# Patient Record
Sex: MALE | Race: BLACK OR AFRICAN AMERICAN | NOT HISPANIC OR LATINO
[De-identification: names, ages, dates, MRNs, and addresses within clinical notes are randomized per-mention and may not be internally consistent; named-entity substitution may affect disease eponyms.]

---

## 2023-11-13 ENCOUNTER — P2P PATIENT RECORD (OUTPATIENT)
Age: 43
End: 2023-11-13

## 2024-01-12 ENCOUNTER — OFFICE VISIT (OUTPATIENT)
Dept: URBAN - METROPOLITAN AREA CLINIC 40 | Facility: CLINIC | Age: 44
End: 2024-01-12
Payer: COMMERCIAL

## 2024-01-12 VITALS — SYSTOLIC BLOOD PRESSURE: 130 MMHG | DIASTOLIC BLOOD PRESSURE: 98 MMHG | HEIGHT: 73 IN | HEART RATE: 83 BPM

## 2024-01-12 DIAGNOSIS — R14.0 BLOATING: ICD-10-CM

## 2024-01-12 DIAGNOSIS — K59.09 CHRONIC CONSTIPATION: ICD-10-CM

## 2024-01-12 DIAGNOSIS — I48.91 AFIB: ICD-10-CM

## 2024-01-12 DIAGNOSIS — K21.9 GERD (GASTROESOPHAGEAL REFLUX DISEASE): ICD-10-CM

## 2024-01-12 DIAGNOSIS — E66.01 MORBID OBESITY: ICD-10-CM

## 2024-01-12 PROBLEM — 238136002: Status: ACTIVE | Noted: 2024-01-12

## 2024-01-12 PROCEDURE — 99203 OFFICE O/P NEW LOW 30 MIN: CPT | Performed by: NURSE PRACTITIONER

## 2024-01-12 RX ORDER — APIXABAN 5 MG/1
AS DIRECTED TABLET, FILM COATED ORAL
Status: ACTIVE | COMMUNITY
Start: 2024-01-11

## 2024-01-12 RX ORDER — PANTOPRAZOLE SODIUM 40 MG/1
1 TABLET TABLET, DELAYED RELEASE ORAL ONCE A DAY
Status: ACTIVE | COMMUNITY
Start: 2024-01-11

## 2024-01-12 RX ORDER — LOSARTAN POTASSIUM AND HYDROCHLOROTHIAZIDE 100; 25 MG/1; MG/1
AS DIRECTED TABLET, FILM COATED ORAL
Status: ACTIVE | COMMUNITY
Start: 2024-01-11

## 2024-01-12 RX ORDER — LOSARTAN POTASSIUM 100 MG/1
AS DIRECTED TABLET, FILM COATED ORAL
Status: ACTIVE | COMMUNITY
Start: 2024-01-11

## 2024-01-12 RX ORDER — IBUPROFEN 200 MG/1
AS DIRECTED TABLET, FILM COATED ORAL
Status: ACTIVE | COMMUNITY
Start: 2024-01-11

## 2024-01-12 RX ORDER — PANTOPRAZOLE SODIUM 40 MG/1
1 TABLET 30 MINUTES BEFORE BREAKFAST TABLET, DELAYED RELEASE ORAL ONCE A DAY
Qty: 30 | Refills: 3 | OUTPATIENT
Start: 2024-01-12

## 2024-01-12 RX ORDER — BENZONATATE 100 MG/1
AS DIRECTED CAPSULE ORAL
Status: ACTIVE | COMMUNITY
Start: 2024-01-11

## 2024-01-12 RX ORDER — DILTIAZEM HYDROCHLORIDE EXTENDED-RELEASE TABLETS 180 MG/1
AS DIRECTED TABLET, EXTENDED RELEASE ORAL
Status: ACTIVE | COMMUNITY
Start: 2024-01-11

## 2024-01-12 RX ORDER — AMLODIPINE BESYLATE 5 MG/1
AS DIRECTED TABLET ORAL
Status: ACTIVE | COMMUNITY
Start: 2024-01-11

## 2024-01-12 NOTE — PHYSICAL EXAM GASTROINTESTINAL
Large Abdomen , soft, nontender, nondistended , no guarding or rigidity , no masses palpable , normal bowel sounds

## 2024-01-12 NOTE — HPI-TODAY'S VISIT:
43-year-old male patient with past medical history as listed below, new patient. No referral noted in chart.  --11/12/23 [facility]: CT Suboptimal study due to streak artifact from body habitus. No obvious acute intra-abdominal or intrapelvic process.  well appearing, hypertensive- pt with hx of GERD, sx worse with food, ate acidic foods > likely GERD- labs including LFTs and lipase unremarkable- given pt body habitus, abd exam can be unreliable > CT a/p w/o acute process noted- EKG interpreted by me, NSR, no acute change from prior, no ischemic changes, POC trop neg > doubt atypical ACS- sx alleviated with protonix and carafate > dispo home with protonix, advised on dietary changes, will refer to GI.    --11/21/23: [facility]: Pt received IV Cardizem x 2 and started on Cardizem drip Echocardiogram with normal EFTSH 2.18CHA2 DS 2 Vasc score-1Cardiology consulted for further recommendations: Started on po cardizem and eliquis by cvm and cleared for discharge  HTN- hold nifedipine for now, now on cardizem.Resume losartan/hctz GERD- PPI, Morbid obesity - encouraged diet and weight loss referred to Dr Wolf with cardiology. New dx Afib.   Principal Problem:  Atrial fibrillation (HCC)Active Problems:  Atrial fibrillation with RVR (HCC)  HTN (hypertension)  Morbid obesity (HCC)   -- The patient presents today accompanied by caregiver staff. He has paperwork with referral and requested to be completed and returned to facility, [facility]. He is with mild intellectual disability and stutter speech impetement. Pleasant, Morbidly obese gentleman. He has c/o gas build up in stomach, burps alot, taking ppi at night. Symptoms worse in morning. After eat has a bubbling up. After breakfast fine, after lunch bubbles up. No pain. Sometimes has reflux. Denies alcohol, tobacco, NSAIDs. Denies family history stomach cancer, CRC. Last BM this morning, occasionaly strains. Usually has BM every day, bannas made him have constipation.

## 2024-01-12 NOTE — PHYSICAL EXAM CONSTITUTIONAL:
Morbidly obese, well developed, well nourished, in no acute distress, ambulating without difficulty, normal communication ability with stutter speech impediment

## 2024-02-07 ENCOUNTER — OV EP (OUTPATIENT)
Dept: URBAN - METROPOLITAN AREA CLINIC 40 | Facility: CLINIC | Age: 44
End: 2024-02-07
Payer: COMMERCIAL

## 2024-02-07 ENCOUNTER — LAB (OUTPATIENT)
Dept: URBAN - METROPOLITAN AREA CLINIC 40 | Facility: CLINIC | Age: 44
End: 2024-02-07

## 2024-02-07 VITALS
HEIGHT: 73 IN | TEMPERATURE: 97.9 F | HEART RATE: 65 BPM | DIASTOLIC BLOOD PRESSURE: 86 MMHG | SYSTOLIC BLOOD PRESSURE: 146 MMHG | WEIGHT: 315 LBS | BODY MASS INDEX: 41.75 KG/M2

## 2024-02-07 DIAGNOSIS — K59.09 CHRONIC CONSTIPATION: ICD-10-CM

## 2024-02-07 DIAGNOSIS — I48.91 AFIB: ICD-10-CM

## 2024-02-07 DIAGNOSIS — K21.9 GERD (GASTROESOPHAGEAL REFLUX DISEASE): ICD-10-CM

## 2024-02-07 DIAGNOSIS — R14.0 BLOATING: ICD-10-CM

## 2024-02-07 DIAGNOSIS — E66.01 MORBID OBESITY: ICD-10-CM

## 2024-02-07 PROCEDURE — 99213 OFFICE O/P EST LOW 20 MIN: CPT | Performed by: NURSE PRACTITIONER

## 2024-02-07 RX ORDER — LOSARTAN POTASSIUM 100 MG/1
AS DIRECTED TABLET, FILM COATED ORAL
Status: ON HOLD | COMMUNITY
Start: 2024-01-11

## 2024-02-07 RX ORDER — BENZONATATE 100 MG/1
AS DIRECTED CAPSULE ORAL
Status: ON HOLD | COMMUNITY
Start: 2024-01-11

## 2024-02-07 RX ORDER — AMLODIPINE BESYLATE 5 MG/1
AS DIRECTED TABLET ORAL
Status: ON HOLD | COMMUNITY
Start: 2024-01-11

## 2024-02-07 RX ORDER — PANTOPRAZOLE SODIUM 40 MG/1
1 TABLET 30 MINUTES BEFORE BREAKFAST TABLET, DELAYED RELEASE ORAL ONCE A DAY
Qty: 30 | Refills: 3 | Status: ON HOLD | COMMUNITY
Start: 2024-01-12

## 2024-02-07 RX ORDER — PANTOPRAZOLE SODIUM 40 MG/1
1 TABLET TABLET, DELAYED RELEASE ORAL ONCE A DAY
Status: ACTIVE | COMMUNITY
Start: 2024-01-11

## 2024-02-07 RX ORDER — IBUPROFEN 200 MG/1
AS DIRECTED TABLET, FILM COATED ORAL
Status: ON HOLD | COMMUNITY
Start: 2024-01-11

## 2024-02-07 RX ORDER — APIXABAN 5 MG/1
AS DIRECTED TABLET, FILM COATED ORAL
Status: ACTIVE | COMMUNITY
Start: 2024-01-11

## 2024-02-07 RX ORDER — DILTIAZEM HYDROCHLORIDE EXTENDED-RELEASE TABLETS 180 MG/1
AS DIRECTED TABLET, EXTENDED RELEASE ORAL
Status: ON HOLD | COMMUNITY
Start: 2024-01-11

## 2024-02-07 RX ORDER — LOSARTAN POTASSIUM AND HYDROCHLOROTHIAZIDE 100; 25 MG/1; MG/1
AS DIRECTED TABLET, FILM COATED ORAL
Status: ACTIVE | COMMUNITY
Start: 2024-01-11

## 2024-02-07 RX ORDER — NEBIVOLOL 5 MG/1
1 TABLET TABLET ORAL ONCE A DAY
Status: ACTIVE | COMMUNITY

## 2024-02-07 NOTE — HPI-TODAY'S VISIT:
43-year-old male patient with past medical history as listed below.  --11/12/23 [facility]: CT Suboptimal study due to streak artifact from body habitus. No obvious acute intra-abdominal or intrapelvic process.well appearing, hypertensive- pt with hx of GERD, sx worse with food,ate acidic foods  likely GERD- labs including LFTs and lipase unremarkable- given pt body habitus, abd exam can be unreliable  CTa/p w/o acute process noted- EKG interpreted by me, NSR, no acute change from prior, no ischemic changes, POC trop neg  doubt atypical ACS- sx alleviated with Protonix and Carafate. dispo home withprotonix, advised on dietary changes, will refer to GI.--11/21/23: [facility]: Pt received IV Cardizem x 2 and started on Cardizem drip Echocardiogram with normal EFTSH 2.18CHA2 DS 2Vasc score-1Cardiology consulted for further recommendations:Started on po Cardizem and Eliquis by cvm and cleared for discharge HTN- hold nifedipine for now, now on Cardizem.Resume losartan/hctz GERD- PPI, Morbid obesity - encouraged diet and weight loss referred to Dr Wolf with cardiology. New dx Afib.Principal Problem: Atrial fibrillation, Active Problems: Atrial fibrillation with RVR, HTN, Morbid obesity   -- 01/12/24: Recall I saw him as new patient accompanied by caregiver staff. He has paperwork with referral and requested to be completed and returned to facility, [facility]. He is with mild intellectual disability and stutter speech impediment. Pleasant, Morbidly obese gentleman. He has c/o gas build up in stomach, burps alot, taking PPI at night. Symptoms worse in morning. After eat has a bubbling up.After breakfast fine, after lunch bubbles up. No pain. Sometimes has reflux. Denies alcohol, tobacco, NSAIDs. Denies family history stomach cancer, CRC. Last BM this morning, occasionally strains.Usually has BM every day, bananas made him have constipation.  Plan: -- Gave sample of FDgard for bloating. -- Gave sample x 3 of MiraLAX for constipation. -- LOWFOMAP diet to follow. -- Gas, bloating handout. -- Take PPI in morning daily. -- 4 weeks f/u, if no better could consider stop ppi and do breath test and BS with tab.   -- The patient presents today accompanied by Estrellita, medical staff from [facility]. She says they have taken over administering his medications whereas he used to give his own meds. He is now taking PPI in mornings. He has had improvement of constipation with MiraLAX. He says feels like food doesn't go down sometimes, but denies choking or dysphagia. He says his bloating is better. He is taking Eliquis twice a day. Estrellita states he is having palpitations and cardiology wants him evaluated for Reflux. Will start with BS with Tab. Given his BMI, if EGD is indicated would need cardiac clearance to hold Eliquis and to be done at Hospital.

## 2024-02-07 NOTE — EXAM-PHYSICAL EXAM
CONSTITUTIONAL: Morbidly obese, well developed, well nourished, in no acute distress, ambulating without difficulty, normal communication ability with stutter speech impediment.         EYES: Conjuntivae and eyelids appear normal, Sclerae : White without injection.         HENT: Head, normocephalic, atraumatic, Face, Face within normal limits, Ears, External ears within normal limits.         CHEST: chest wall non-tender, breathing is unlabored without accessory muscle use, normal breath sounds.         CARDIOVASCULAR: no edema, no murmurs, regular rate and rhythm.         GASTROINTESTINAL Large Abdomen , soft, nontender, nondistended , no guarding or rigidity , no masses palpable , normal bowel sounds .

## 2024-03-18 ENCOUNTER — OV EP (OUTPATIENT)
Dept: URBAN - METROPOLITAN AREA CLINIC 40 | Facility: CLINIC | Age: 44
End: 2024-03-18
Payer: COMMERCIAL

## 2024-03-18 VITALS
OXYGEN SATURATION: 98 % | TEMPERATURE: 97.7 F | HEIGHT: 73 IN | HEART RATE: 60 BPM | DIASTOLIC BLOOD PRESSURE: 92 MMHG | WEIGHT: 315 LBS | SYSTOLIC BLOOD PRESSURE: 144 MMHG | BODY MASS INDEX: 41.75 KG/M2

## 2024-03-18 DIAGNOSIS — K59.09 CHRONIC CONSTIPATION: ICD-10-CM

## 2024-03-18 DIAGNOSIS — R14.0 BLOATING: ICD-10-CM

## 2024-03-18 DIAGNOSIS — E66.01 MORBID OBESITY: ICD-10-CM

## 2024-03-18 PROCEDURE — 99213 OFFICE O/P EST LOW 20 MIN: CPT | Performed by: NURSE PRACTITIONER

## 2024-03-18 RX ORDER — NEBIVOLOL 5 MG/1
1 TABLET TABLET ORAL ONCE A DAY
Status: ACTIVE | COMMUNITY

## 2024-03-18 RX ORDER — APIXABAN 5 MG/1
AS DIRECTED TABLET, FILM COATED ORAL
Status: ACTIVE | COMMUNITY
Start: 2024-01-11

## 2024-03-18 RX ORDER — PANTOPRAZOLE SODIUM 40 MG/1
1 TABLET TABLET, DELAYED RELEASE ORAL ONCE A DAY
Status: ACTIVE | COMMUNITY
Start: 2024-01-11

## 2024-03-18 RX ORDER — LOSARTAN POTASSIUM AND HYDROCHLOROTHIAZIDE 100; 25 MG/1; MG/1
AS DIRECTED TABLET, FILM COATED ORAL
Status: ACTIVE | COMMUNITY
Start: 2024-01-11

## 2024-03-18 NOTE — HPI-TODAY'S VISIT:
43-year-old male patient with past medical history as listed below.  --11/12/23 [facility]: CT Suboptimal study due to streak artifact from body habitus. No obvious acute intra-abdominal or intrapelvic process.well appearing, hypertensive- pt with hx of GERD, sx worse with food,ate acidic foods likely GERD- labs including LFTs and lipase unremarkable- given pt body habitus, abd exam can be unreliable CTa/p w/o acute process noted- EKG interpreted by me, NSR, no acute change from prior, no ischemic changes, POC trop neg doubt atypical ACS- sx alleviated with Protonix and Carafate. dispo home with protonix, advised on dietary changes, will refer to GI.  --11/21/23: [facility]: Pt received IV Cardizem x 2 and started on Cardizem drip Echocardiogram with normal EFTSH 2.18CHA2 DS 2Vasc [id number] consulted for further recommendations:Started on po Cardizem and Eliquis by cvm and cleared for discharge HTN- hold nifedipine for now, now on Cardizem.Resume losartan/hctz GERDPPI, Morbid obesity - encouraged diet and weight loss referred to Dr Wolf with cardiology. New dx Afib.Principal Problem: Atrial fibrillation, Active Problems: Atrial fibrillation with RVR, HTN, Morbid obesity  -- 01/12/24: Recall I saw him as new patient accompanied by caregiver staff. He has paperwork with referral and requested to be completed and returned to facility, [facility]. He is with mild intellectual disability and stutter speech impediment. Pleasant, Morbidly obese gentleman. He has c/o gas build up in stomach, burps alot, taking PPI at night. Symptoms worse in morning. After eat has a bubbling up. After breakfast fine, after lunch bubbles up. No pain. Sometimes has reflux. Denies alcohol, tobacco, NSAIDs. Denies family history stomach cancer, CRC. Last BM this morning, occasionally strains.Usually has BM every day, bananas made him have constipation. Plan: -- Gave sample of FDgard for bloating. -- Gave sample x 3 of MiraLAX for constipation. -- LOWFOMAP diet to follow. -- Gas, bloating handout. -- Take PPI in morning daily. -- 4 weeks f/u, if no better could consider stop ppi and do breath test and BS with tab.  -- 02/07/24: recall I saw patient accompanied by Estrellita, medical staff from [facility]. She says they have taken over administering his medications whereas he used to give his own meds. -- Constipation improved with MiraLAX, continue. -- Continue Pantoprazole in mornings. GERD diet. -- Will order BS with tab to evaluate for reflux, stricture, motility. If EGD indicated will need to be done at hospital with cardiac clearance to hold Eliquis and for BMI.  --February 20, 2024 barium swallow no abnormality identified, esophageal peristalsis is normal, no constricting or obstructing lesions are seen.  Mucosa is normal in appearance, no hiatal hernia seen no gastric reflux seen during the exam.  -- The patient presents today for follow-up and discussion of barium swallow results.  He is accompanied by caregiver from the [facility].  He is doing fairly well.  Discussion had with him and his caregiver agreement with sitting upright after meals.  Walking after meals.  Increasing water intake.  Encouraged healthy diet and weight loss.  Discussed may continue MiraLAX as needed for constipation and Gas-X as needed for bloating and gas.  Normal barium swallow, no need for EGD at this time.  Would advise diet lifestyle and symptomatic treatment.

## 2024-03-18 NOTE — PHYSICAL EXAM CONSTITUTIONAL:
well developed, well nourished , in no acute distress , ambulating without difficulty , normal communication ability, Morbidly obese.

## 2024-07-17 ENCOUNTER — TELEPHONE ENCOUNTER (OUTPATIENT)
Dept: URBAN - METROPOLITAN AREA CLINIC 40 | Facility: CLINIC | Age: 44
End: 2024-07-17

## 2024-07-17 RX ORDER — PANTOPRAZOLE SODIUM 40 MG/1
1 TABLET TABLET, DELAYED RELEASE ORAL ONCE A DAY
Qty: 90 TABLET | Refills: 0
Start: 2024-01-11

## 2024-10-15 ENCOUNTER — ERX REFILL RESPONSE (OUTPATIENT)
Dept: URBAN - METROPOLITAN AREA CLINIC 40 | Facility: CLINIC | Age: 44
End: 2024-10-15

## 2024-10-15 RX ORDER — PANTOPRAZOLE SODIUM 40 MG/1
1 TABLET TABLET, DELAYED RELEASE ORAL ONCE A DAY
Qty: 90 TABLET | Refills: 0 | OUTPATIENT

## 2024-10-30 ENCOUNTER — ERX REFILL RESPONSE (OUTPATIENT)
Dept: URBAN - METROPOLITAN AREA CLINIC 40 | Facility: CLINIC | Age: 44
End: 2024-10-30

## 2024-10-30 RX ORDER — PANTOPRAZOLE SODIUM 40 MG/1
TAKE 1 TABLET ONCE DAILY TABLET, DELAYED RELEASE ORAL
Qty: 93 TABLET | Refills: 0 | OUTPATIENT

## 2024-10-30 RX ORDER — PANTOPRAZOLE SODIUM 40 MG/1
TAKE 1 TABLET ONCE DAILY TABLET, DELAYED RELEASE ORAL
Qty: 93 TABLET | Refills: 1 | OUTPATIENT

## 2025-03-13 ENCOUNTER — ERX REFILL RESPONSE (OUTPATIENT)
Dept: URBAN - METROPOLITAN AREA CLINIC 40 | Facility: CLINIC | Age: 45
End: 2025-03-13

## 2025-03-13 RX ORDER — PANTOPRAZOLE SODIUM 40 MG/1
TAKE 1 TABLET ONCE DAILY TABLET, DELAYED RELEASE ORAL
Qty: 93 TABLET | Refills: 0 | OUTPATIENT

## 2025-03-13 RX ORDER — PANTOPRAZOLE SODIUM 40 MG/1
1 TABLET TABLET, DELAYED RELEASE ORAL ONCE A DAY
Qty: 30 TABLET | Refills: 0 | OUTPATIENT